# Patient Record
Sex: FEMALE | Race: WHITE | NOT HISPANIC OR LATINO | Employment: STUDENT | URBAN - METROPOLITAN AREA
[De-identification: names, ages, dates, MRNs, and addresses within clinical notes are randomized per-mention and may not be internally consistent; named-entity substitution may affect disease eponyms.]

---
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
## 2024-03-09 ENCOUNTER — OFFICE VISIT (OUTPATIENT)
Dept: URGENT CARE | Age: 19
End: 2024-03-09
Payer: COMMERCIAL

## 2024-03-09 VITALS
RESPIRATION RATE: 18 BRPM | TEMPERATURE: 99.1 F | SYSTOLIC BLOOD PRESSURE: 150 MMHG | HEART RATE: 99 BPM | DIASTOLIC BLOOD PRESSURE: 89 MMHG | OXYGEN SATURATION: 98 %

## 2024-03-09 DIAGNOSIS — R00.2 PALPITATIONS: ICD-10-CM

## 2024-03-09 DIAGNOSIS — R42 DIZZINESS: Primary | ICD-10-CM

## 2024-03-09 LAB
GLUCOSE SERPL-MCNC: 100 MG/DL (ref 65–140)
SARS-COV-2 AG UPPER RESP QL IA: NEGATIVE
SL AMB  POCT GLUCOSE, UA: NORMAL
SL AMB LEUKOCYTE ESTERASE,UA: NORMAL
SL AMB POCT BILIRUBIN,UA: NORMAL
SL AMB POCT BLOOD,UA: NORMAL
SL AMB POCT CLARITY,UA: CLEAR
SL AMB POCT COLOR,UA: YELLOW
SL AMB POCT GLUCOSE BLD: 100
SL AMB POCT KETONES,UA: NORMAL
SL AMB POCT NITRITE,UA: NORMAL
SL AMB POCT PH,UA: 7
SL AMB POCT SPECIFIC GRAVITY,UA: 1
SL AMB POCT URINE HCG: NEGATIVE
SL AMB POCT URINE PROTEIN: NORMAL
SL AMB POCT UROBILINOGEN: 0.2
VALID CONTROL: NORMAL

## 2024-03-09 PROCEDURE — 82948 REAGENT STRIP/BLOOD GLUCOSE: CPT | Performed by: NURSE PRACTITIONER

## 2024-03-09 PROCEDURE — 81025 URINE PREGNANCY TEST: CPT | Performed by: NURSE PRACTITIONER

## 2024-03-09 PROCEDURE — 99215 OFFICE O/P EST HI 40 MIN: CPT | Performed by: NURSE PRACTITIONER

## 2024-03-09 PROCEDURE — 81002 URINALYSIS NONAUTO W/O SCOPE: CPT | Performed by: NURSE PRACTITIONER

## 2024-03-09 PROCEDURE — 87811 SARS-COV-2 COVID19 W/OPTIC: CPT | Performed by: NURSE PRACTITIONER

## 2024-03-09 PROCEDURE — 93005 ELECTROCARDIOGRAM TRACING: CPT | Performed by: NURSE PRACTITIONER

## 2024-03-09 NOTE — PATIENT INSTRUCTIONS
All testing today was negative for an acute process; EKG, urine, urine pregnancy test, blood glucose and covid.  You are to stop the caffeine use as this is a stimulant.    You are to follow up with your PCP for further testing if symptoms continue. Stay hydrated and get enough sleep.  Go to the ED if symptoms worsen

## 2024-03-09 NOTE — PROGRESS NOTES
"  [facility]        NAME: Mago rGay is a 18 y.o. female  : 2005    MRN: [id number]  DATE: 2024  TIME: 1:54 PM    Assessment and Plan   Dizziness [R42]  1. Dizziness  ECG 12 lead    POCT urine dip    POCT urine HCG    Poct Covid 19 Rapid Antigen Test    POCT blood glucose      2. Palpitations  ECG 12 lead    POCT urine dip    POCT urine HCG    Poct Covid 19 Rapid Antigen Test    POCT blood glucose            Patient Instructions       Follow up with PCP in 3-5 days.  Proceed to  ER if symptoms worsen.    If tests have been performed at [facility], our office will contact you with results if changes need to be made to the care plan discussed with you at the visit.  You can review your full results on [facility].        All testing today was negative for an acute process; EKG, urine, urine pregnancy test, blood glucose and covid.  You are to stop the caffeine use as this is a stimulant.    You are to follow up with your PCP for further testing if symptoms continue. Stay hydrated and get enough sleep.  Go to the ED if symptoms worsen      Chief Complaint     Chief Complaint   Patient presents with    Dizziness     Dizziness off and on several days. Tiredness         History of Present Illness       This is a 18 year old female who states that since this Tuesday she has had dizziness and feeling fuzzy. She denies the room spinning.  She states that she has had these symptoms intermittent since. She denies n/v/d, fevers chills. She states a friend was sick with something.  She is a college student at [employer] and PCP is in [place].   She denies hx of vertigo.  She denies any cold symptoms.  She states she did have \"3 shots of espresso and that is when she noted her symptoms\".  She states she has stopped all caffeine and is still having the symptoms.  She denies dysuria or pregnancy but admits no birth control and is using condoms.  She denies hx of cardiac issues, or diabetes. But " states is concerned she could have diabetes.  She states she is vegan.            Review of Systems   Review of Systems   Constitutional: Negative.    HENT: Negative.     Eyes: Negative.    Cardiovascular:  Positive for palpitations.   Gastrointestinal: Negative.    Endocrine: Negative.    Genitourinary: Negative.    Musculoskeletal: Negative.    Skin: Negative.    Allergic/Immunologic: Negative.    Neurological:  Positive for dizziness.   Hematological: Negative.    Psychiatric/Behavioral: Negative.           Current Medications       Current Outpatient Medications:     acetaminophen (TYLENOL) 325 mg tablet, Take 3 tablets (975 mg total) by mouth every 8 (eight) hours, Disp: 60 tablet, Rfl: 0    methocarbamol (ROBAXIN) 750 mg tablet, Take 1 tablet (750 mg total) by mouth every 6 (six) hours for 27 doses, Disp: 20 tablet, Rfl: 0    Current Allergies     Allergies as of 03/09/2024    (No Known Allergies)            The following portions of the patient's history were reviewed and updated as appropriate: allergies, current medications, past family history, past medical history, past social history, past surgical history and problem list.     History reviewed. No pertinent past medical history.    History reviewed. No pertinent surgical history.    History reviewed. No pertinent family history.      Medications have been verified.        Objective   /89   Pulse 99   Temp 99.1 °F (37.3 °C) (Oral)   Resp 18   LMP 02/05/2024 (Approximate)   SpO2 98%   Patient's last menstrual period was 02/05/2024 (approximate).       Physical Exam     Physical Exam  Vitals and nursing note reviewed.   Constitutional:       General: She is not in acute distress.     Appearance: Normal appearance. She is normal weight. She is not ill-appearing, toxic-appearing or diaphoretic.   HENT:      Head: Normocephalic and atraumatic.      Right Ear: Tympanic membrane and ear canal normal.      Left Ear: Tympanic membrane and ear canal  normal.      Nose: Nose normal. No congestion or rhinorrhea.      Mouth/Throat:      Mouth: Mucous membranes are moist.      Pharynx: Oropharynx is clear. No oropharyngeal exudate or posterior oropharyngeal erythema.   Eyes:      Extraocular Movements: Extraocular movements intact.      Pupils: Pupils are equal, round, and reactive to light.   Cardiovascular:      Rate and Rhythm: Normal rate and regular rhythm.      Pulses: Normal pulses.      Heart sounds: Normal heart sounds. No murmur heard.  Pulmonary:      Effort: Pulmonary effort is normal. No respiratory distress.      Breath sounds: Normal breath sounds. No stridor. No wheezing, rhonchi or rales.   Chest:      Chest wall: No tenderness.   Musculoskeletal:         General: Normal range of motion.      Cervical back: Normal range of motion and neck supple.   Skin:     General: Skin is warm and dry.      Capillary Refill: Capillary refill takes less than 2 seconds.   Neurological:      General: No focal deficit present.      Mental Status: She is alert and oriented to person, place, and time.      GCS: GCS eye subscore is 4. GCS verbal subscore is 5. GCS motor subscore is 6.      Cranial Nerves: Cranial nerves 2-12 are intact. No cranial nerve deficit.      Sensory: Sensation is intact. No sensory deficit.      Motor: Motor function is intact. No weakness.      Coordination: Coordination is intact. Coordination normal.      Gait: Gait and tandem walk normal.      Deep Tendon Reflexes: Reflexes normal.   Psychiatric:         Mood and Affect: Mood normal.         Behavior: Behavior normal.         Thought Content: Thought content normal.         Judgment: Judgment normal.             Poct urine negative.   Hcg negative  Blood glucose    EKG NSR 99    QRS 86    No stemi.

## 2024-03-10 LAB
ATRIAL RATE: 99 BPM
P AXIS: 49 DEGREES
PR INTERVAL: 140 MS
QRS AXIS: 96 DEGREES
QRSD INTERVAL: 86 MS
QT INTERVAL: 346 MS
QTC INTERVAL: 444 MS
T WAVE AXIS: 45 DEGREES
VENTRICULAR RATE: 99 BPM

## 2024-03-10 PROCEDURE — 93010 ELECTROCARDIOGRAM REPORT: CPT | Performed by: INTERNAL MEDICINE

## 2024-07-22 ENCOUNTER — OFFICE VISIT (OUTPATIENT)
Dept: URGENT CARE | Age: 19
End: 2024-07-22
Payer: COMMERCIAL

## 2024-07-22 ENCOUNTER — APPOINTMENT (OUTPATIENT)
Dept: RADIOLOGY | Age: 19
End: 2024-07-22
Payer: COMMERCIAL

## 2024-07-22 VITALS
HEART RATE: 124 BPM | RESPIRATION RATE: 18 BRPM | TEMPERATURE: 98.1 F | SYSTOLIC BLOOD PRESSURE: 126 MMHG | DIASTOLIC BLOOD PRESSURE: 84 MMHG | OXYGEN SATURATION: 100 %

## 2024-07-22 DIAGNOSIS — J06.9 VIRAL URI: Primary | ICD-10-CM

## 2024-07-22 DIAGNOSIS — R05.1 ACUTE COUGH: ICD-10-CM

## 2024-07-22 PROCEDURE — 71046 X-RAY EXAM CHEST 2 VIEWS: CPT

## 2024-07-22 PROCEDURE — G0382 LEV 3 HOSP TYPE B ED VISIT: HCPCS | Performed by: STUDENT IN AN ORGANIZED HEALTH CARE EDUCATION/TRAINING PROGRAM

## 2024-07-22 PROCEDURE — S9083 URGENT CARE CENTER GLOBAL: HCPCS | Performed by: STUDENT IN AN ORGANIZED HEALTH CARE EDUCATION/TRAINING PROGRAM

## 2024-07-22 RX ORDER — BROMPHENIRAMINE MALEATE, PSEUDOEPHEDRINE HYDROCHLORIDE, AND DEXTROMETHORPHAN HYDROBROMIDE 2; 30; 10 MG/5ML; MG/5ML; MG/5ML
5 SYRUP ORAL 3 TIMES DAILY PRN
Qty: 120 ML | Refills: 0 | Status: SHIPPED | OUTPATIENT
Start: 2024-07-22

## 2024-07-22 RX ORDER — FLUTICASONE PROPIONATE 50 MCG
1 SPRAY, SUSPENSION (ML) NASAL DAILY
Qty: 11.1 ML | Refills: 0 | Status: SHIPPED | OUTPATIENT
Start: 2024-07-22

## 2024-07-22 NOTE — PATIENT INSTRUCTIONS
I do not see pneumonia on your chest x-ray.  The radiologist will also read your checks x-ray, if there are any changes to our plan, we will give you a call.  You can also follow your results on your MyChart.  I am sending a medication to take as needed for cough.   To help clear out the mucus and reduce post-nasal drip which can cause sore throat and cough - use saline nasal spray or saline nasal rinse (with distilled or boiled water).  After nasal saline, use flonase nasal spray which is a steroid to reduce post nasal drip leading to cough.  To soothe sore throat, you may try warm tea with honey, warm salt water gargles.  You may take Tylenol or Motrin to help with fever/chills or muscle aches.  Stay well hydrated and get plenty of rest.     If your symptoms are worsening or fail to improve in the coming days, please return to see us or schedule with your PCP.  If you develop any severe/worsening symptoms please go to the ER.      Nasal Saline Rinse:

## 2024-07-22 NOTE — PROGRESS NOTES
[facility]        NAME: Mago Gray is a 19 y.o. female  : 2005    MRN: [id number]  DATE: 2024  TIME: 7:27 PM    Assessment and Plan   Viral URI [J06.9]  1. Viral URI        2. Acute cough  XR chest pa & lateral            Patient Instructions   I do not see pneumonia on your chest x-ray.  The radiologist will also read your checks x-ray, if there are any changes to our plan, we will give you a call.  You can also follow your results on your MyChart.  I am sending a medication to take as needed for cough.   To help clear out the mucus and reduce post-nasal drip which can cause sore throat and cough - use saline nasal spray or saline nasal rinse (with distilled or boiled water).  After nasal saline, use flonase nasal spray which is a steroid to reduce post nasal drip leading to cough.  To soothe sore throat, you may try warm tea with honey, warm salt water gargles.  You may take Tylenol or Motrin to help with fever/chills or muscle aches.  Stay well hydrated and get plenty of rest.     If your symptoms are worsening or fail to improve in the coming days, please return to see us or schedule with your PCP.  If you develop any severe/worsening symptoms please go to the ER.    Follow up with PCP in 3-5 days.  Proceed to  ER if symptoms worsen.    If tests have been performed at [facility], our office will contact you with results if changes need to be made to the care plan discussed with you at the visit.  You can review your full results on [facility] MyChart.    Chief Complaint     Chief Complaint   Patient presents with    Cough    Fever    Fatigue    Headache     Patient states productive cough and due cough chest hurt, feels fatigue and fever and headache  since Friday.         History of Present Illness       Patient presents for evaluation of cough.  Over the last week, started with mild scratchy throat, over the last 4 days productive cough.  Couple days ago, she had temp mildly  elevated to 99.5F, does not feel much nasal congestion.  Took Advil X1 for some muscle aches.  No wheezing, shortness of breath.  She was concern for possible pneumonia as the cough felt deep and she is going away for trip in a few days.  Notes that she always is tachycardic in doctors offices as she is nervous.        Review of Systems   Review of Systems   All other systems reviewed and are negative.        Current Medications       Current Outpatient Medications:     acetaminophen (TYLENOL) 325 mg tablet, Take 3 tablets (975 mg total) by mouth every 8 (eight) hours, Disp: 60 tablet, Rfl: 0    methocarbamol (ROBAXIN) 750 mg tablet, Take 1 tablet (750 mg total) by mouth every 6 (six) hours for 27 doses, Disp: 20 tablet, Rfl: 0    Current Allergies     Allergies as of 07/22/2024    (No Known Allergies)            The following portions of the patient's history were reviewed and updated as appropriate: allergies, current medications, past family history, past medical history, past social history, past surgical history and problem list.     History reviewed. No pertinent past medical history.    History reviewed. No pertinent surgical history.    No family history on file.      Medications have been verified.        Objective   /84   Pulse (!) 124   Temp 98.1 °F (36.7 °C) (Tympanic)   Resp 18   LMP 07/08/2024 (Approximate)   SpO2 100%   Patient's last menstrual period was 07/08/2024 (approximate).       Physical Exam     Physical Exam  Vitals and nursing note reviewed.   Constitutional:       General: She is not in acute distress.     Appearance: Normal appearance. She is not ill-appearing or toxic-appearing.   HENT:      Head: Normocephalic and atraumatic.      Right Ear: Tympanic membrane, ear canal and external ear normal.      Left Ear: Tympanic membrane, ear canal and external ear normal.      Nose: Nose normal. No congestion or rhinorrhea.      Mouth/Throat:      Mouth: Mucous membranes are moist.       Comments: +PND/ cobblestoning  Eyes:      Extraocular Movements: Extraocular movements intact.   Cardiovascular:      Rate and Rhythm: Normal rate and regular rhythm.      Heart sounds: Normal heart sounds.   Pulmonary:      Effort: Pulmonary effort is normal. No respiratory distress.      Breath sounds: Normal breath sounds. No stridor. No wheezing, rhonchi or rales.   Skin:     General: Skin is warm and dry.      Capillary Refill: Capillary refill takes less than 2 seconds.      Findings: No rash.   Neurological:      Mental Status: She is alert.      Gait: Gait normal.   Psychiatric:         Behavior: Behavior normal.

## 2025-04-28 ENCOUNTER — OFFICE VISIT (OUTPATIENT)
Dept: URGENT CARE | Age: 20
End: 2025-04-28
Payer: COMMERCIAL

## 2025-04-28 VITALS
WEIGHT: 165 LBS | HEART RATE: 104 BPM | OXYGEN SATURATION: 98 % | HEIGHT: 66 IN | TEMPERATURE: 97.8 F | SYSTOLIC BLOOD PRESSURE: 128 MMHG | RESPIRATION RATE: 18 BRPM | BODY MASS INDEX: 26.52 KG/M2 | DIASTOLIC BLOOD PRESSURE: 70 MMHG

## 2025-04-28 DIAGNOSIS — J02.9 SORE THROAT: Primary | ICD-10-CM

## 2025-04-28 LAB — S PYO AG THROAT QL: NEGATIVE

## 2025-04-28 PROCEDURE — S9083 URGENT CARE CENTER GLOBAL: HCPCS | Performed by: STUDENT IN AN ORGANIZED HEALTH CARE EDUCATION/TRAINING PROGRAM

## 2025-04-28 PROCEDURE — G0382 LEV 3 HOSP TYPE B ED VISIT: HCPCS | Performed by: STUDENT IN AN ORGANIZED HEALTH CARE EDUCATION/TRAINING PROGRAM

## 2025-04-28 PROCEDURE — 87880 STREP A ASSAY W/OPTIC: CPT | Performed by: STUDENT IN AN ORGANIZED HEALTH CARE EDUCATION/TRAINING PROGRAM

## 2025-04-28 PROCEDURE — 87070 CULTURE OTHR SPECIMN AEROBIC: CPT | Performed by: STUDENT IN AN ORGANIZED HEALTH CARE EDUCATION/TRAINING PROGRAM

## 2025-04-28 NOTE — LETTER
April 28, 2025     Patient: Mago Gray   YOB: 2005   Date of Visit: 4/28/2025       To Whom it May Concern:    Mago Gray was seen in my clinic on 4/28/2025.  Please excuse her on 4/29/2025.  If you have any questions or concerns, please don't hesitate to call.         Sincerely,          Cristal Spear, DO

## 2025-04-28 NOTE — PATIENT INSTRUCTIONS
Your rapid strep test was negative, we will send your throat swab to the lab for throat culture, if this grows group A strep, we will call you to start antibiotics.  Otherwise, it is likely that a virus is causing your symptoms.  I recommend continuing alternating Advil and Tylenol as needed for pain.  Use warm salt water gargles, warm tea with honey to help with your throat.  Taking a daily allergy medicine such as Zyrtec can help with sinus congestion and ear discomfort.    If your symptoms are not improving in the coming week, please follow-up with your PCP.  If you have any severe worsening symptoms, please go to the ER.

## 2025-04-28 NOTE — PROGRESS NOTES
[facility]        NAME: Mago Gray is a 20 y.o. female  : 2005    MRN: [id number]  DATE: 2025  TIME: 12:41 PM    Assessment and Plan   Sore throat [J02.9]  1. Sore throat  POCT rapid ANTIGEN strepA    Throat culture            Patient Instructions   Your rapid strep test was negative, we will send your throat swab to the lab for throat culture, if this grows group A strep, we will call you to start antibiotics.  Otherwise, it is likely that a virus is causing your symptoms.  I recommend continuing alternating Advil and Tylenol as needed for pain.  Use warm salt water gargles, warm tea with honey to help with your throat.  Taking a daily allergy medicine such as Zyrtec can help with sinus congestion and ear discomfort.    If your symptoms are not improving in the coming week, please follow-up with your PCP.  If you have any severe worsening symptoms, please go to the ER.    Follow up with PCP in 3-5 days.  Proceed to  ER if symptoms worsen.    If tests have been performed at [facility], our office will contact you with results if changes need to be made to the care plan discussed with you at the visit.  You can [facility] full [facility] on [facility].    Chief Complaint     Chief Complaint   Patient presents with    Sore Throat     Pt c/o sore throat that has worsening over the last three days. Also c/o ear pressure.          History of Present Illness       Patient presents for evaluation of sore throat that started on .  Started with a tickle in her throat, has been worsening a bit each day since then.  No fevers, chills.  She did feel some bilateral ear discomfort.  She took Advil this morning which helped with her symptoms.  No congestion, cough.      Sore Throat         Review of Systems   Review of Systems   HENT:  Positive for sore throat.    All other systems reviewed and are negative.        Current Medications       Current Outpatient Medications:      "acetaminophen (TYLENOL) 325 mg tablet, Take 3 tablets (975 mg total) by mouth every 8 (eight) hours, Disp: 60 tablet, Rfl: 0    brompheniramine-pseudoephedrine-DM 30-2-10 MG/5ML syrup, Take 5 mL by mouth 3 (three) times a day as needed for cough (Patient not taking: Reported on 4/28/2025), Disp: 120 mL, Rfl: 0    fluticasone (FLONASE) 50 mcg/act nasal spray, 1 spray into each nostril daily (Patient not taking: Reported on 4/28/2025), Disp: 11.1 mL, Rfl: 0    methocarbamol (ROBAXIN) 750 mg tablet, Take 1 tablet (750 mg total) by mouth every 6 (six) hours for 27 doses, Disp: 20 tablet, Rfl: 0    Current Allergies     Allergies as of 04/28/2025    (No Known Allergies)            The following portions of the patient's history were reviewed and updated as appropriate: allergies, current medications, past family history, past medical history, past social history, past surgical history and problem list.     No past medical history on file.    No past surgical history on file.    No family history on file.      Medications have been verified.        Objective   /70   Pulse 104   Temp 97.8 °F (36.6 °C)   Resp 18   Ht 5' 6\" (1.676 m)   Wt 74.8 kg (165 lb)   LMP 03/27/2025 (Approximate)   SpO2 98%   BMI 26.63 kg/m²   Patient's last menstrual period was 03/27/2025 (approximate).       Physical Exam     Physical Exam  Vitals and nursing note reviewed.   Constitutional:       General: She is not in acute distress.     Appearance: Normal appearance. She is not ill-appearing or toxic-appearing.   HENT:      Head: Normocephalic and atraumatic.      Right Ear: Ear canal and external ear normal.      Left Ear: Ear canal and external ear normal.      Ears:      Comments: Bilateral clear effusions     Nose: Nose normal.      Mouth/Throat:      Mouth: Mucous membranes are moist.      Pharynx: Uvula midline.      Tonsils: 3+ on the right. 2+ on the left.      Comments: Right tonsil with exudate  Eyes:      Extraocular " Movements: Extraocular movements intact.   Cardiovascular:      Rate and Rhythm: Normal rate and regular rhythm.      Heart sounds: Normal heart sounds.   Pulmonary:      Effort: Pulmonary effort is normal. No respiratory distress.      Breath sounds: Normal breath sounds. No stridor. No wheezing, rhonchi or rales.   Skin:     General: Skin is warm and dry.      Capillary Refill: Capillary refill takes less than 2 seconds.      Findings: No rash.   Neurological:      Mental Status: She is alert.      Gait: Gait normal.   Psychiatric:         Behavior: Behavior normal.

## 2025-04-29 LAB — BACTERIA THROAT CULT: NORMAL

## 2025-05-02 ENCOUNTER — APPOINTMENT (EMERGENCY)
Dept: RADIOLOGY | Facility: HOSPITAL | Age: 20
End: 2025-05-02
Payer: COMMERCIAL

## 2025-05-02 ENCOUNTER — OFFICE VISIT (OUTPATIENT)
Dept: URGENT CARE | Age: 20
End: 2025-05-02
Payer: COMMERCIAL

## 2025-05-02 ENCOUNTER — HOSPITAL ENCOUNTER (EMERGENCY)
Facility: HOSPITAL | Age: 20
Discharge: HOME | End: 2025-05-02
Attending: EMERGENCY MEDICINE
Payer: COMMERCIAL

## 2025-05-02 VITALS
HEIGHT: 66 IN | WEIGHT: 188.1 LBS | HEART RATE: 106 BPM | RESPIRATION RATE: 20 BRPM | BODY MASS INDEX: 30.23 KG/M2 | SYSTOLIC BLOOD PRESSURE: 121 MMHG | OXYGEN SATURATION: 99 % | DIASTOLIC BLOOD PRESSURE: 71 MMHG | TEMPERATURE: 98.4 F

## 2025-05-02 VITALS
RESPIRATION RATE: 18 BRPM | TEMPERATURE: 98.2 F | OXYGEN SATURATION: 99 % | DIASTOLIC BLOOD PRESSURE: 79 MMHG | HEART RATE: 86 BPM | SYSTOLIC BLOOD PRESSURE: 146 MMHG

## 2025-05-02 DIAGNOSIS — J02.9 ACUTE PHARYNGITIS, UNSPECIFIED ETIOLOGY: Primary | ICD-10-CM

## 2025-05-02 DIAGNOSIS — J03.90 TONSILLITIS: Primary | ICD-10-CM

## 2025-05-02 LAB
ANION GAP SERPL CALCULATED.3IONS-SCNC: 9 MMOL/L (ref 4–13)
ANISOCYTOSIS BLD QL SMEAR: PRESENT
BASOPHILS # BLD MANUAL: 0 THOUSAND/UL (ref 0–0.1)
BASOPHILS NFR MAR MANUAL: 0 % (ref 0–1)
BUN SERPL-MCNC: 7 MG/DL (ref 5–25)
CALCIUM SERPL-MCNC: 9.5 MG/DL (ref 8.4–10.2)
CHLORIDE SERPL-SCNC: 104 MMOL/L (ref 96–108)
CO2 SERPL-SCNC: 25 MMOL/L (ref 21–32)
CREAT SERPL-MCNC: 0.46 MG/DL (ref 0.6–1.3)
EOSINOPHIL # BLD MANUAL: 0 THOUSAND/UL (ref 0–0.4)
EOSINOPHIL NFR BLD MANUAL: 0 % (ref 0–6)
ERYTHROCYTE [DISTWIDTH] IN BLOOD BY AUTOMATED COUNT: 14 % (ref 11.6–15.1)
EXT PREGNANCY TEST URINE: NEGATIVE
EXT. CONTROL: NORMAL
GFR SERPL CREATININE-BSD FRML MDRD: 142 ML/MIN/1.73SQ M
GLUCOSE SERPL-MCNC: 82 MG/DL (ref 65–140)
HCT VFR BLD AUTO: 38.6 % (ref 34.8–46.1)
HGB BLD-MCNC: 12.8 G/DL (ref 11.5–15.4)
LYMPHOCYTES # BLD AUTO: 41 % (ref 14–44)
LYMPHOCYTES # BLD AUTO: 5.53 THOUSAND/UL (ref 0.6–4.47)
MCH RBC QN AUTO: 28.1 PG (ref 26.8–34.3)
MCHC RBC AUTO-ENTMCNC: 33.2 G/DL (ref 31.4–37.4)
MCV RBC AUTO: 85 FL (ref 82–98)
MONOCYTES # BLD AUTO: 0.94 THOUSAND/UL (ref 0–1.22)
MONOCYTES NFR BLD: 8 % (ref 4–12)
NEUTROPHILS # BLD MANUAL: 5.3 THOUSAND/UL (ref 1.85–7.62)
NEUTS BAND NFR BLD MANUAL: 4 % (ref 0–8)
NEUTS SEG NFR BLD AUTO: 41 % (ref 43–75)
PLATELET # BLD AUTO: 269 THOUSANDS/UL (ref 149–390)
PLATELET BLD QL SMEAR: ADEQUATE
PMV BLD AUTO: 8.9 FL (ref 8.9–12.7)
POLYCHROMASIA BLD QL SMEAR: PRESENT
POTASSIUM SERPL-SCNC: 4.3 MMOL/L (ref 3.5–5.3)
RBC # BLD AUTO: 4.55 MILLION/UL (ref 3.81–5.12)
RBC MORPH BLD: PRESENT
SODIUM SERPL-SCNC: 138 MMOL/L (ref 135–147)
VARIANT LYMPHS # BLD AUTO: 6 %
WBC # BLD AUTO: 11.77 THOUSAND/UL (ref 4.31–10.16)

## 2025-05-02 PROCEDURE — 85027 COMPLETE CBC AUTOMATED: CPT

## 2025-05-02 PROCEDURE — 96365 THER/PROPH/DIAG IV INF INIT: CPT

## 2025-05-02 PROCEDURE — 85007 BL SMEAR W/DIFF WBC COUNT: CPT

## 2025-05-02 PROCEDURE — 80048 BASIC METABOLIC PNL TOTAL CA: CPT

## 2025-05-02 PROCEDURE — 86308 HETEROPHILE ANTIBODY SCREEN: CPT

## 2025-05-02 PROCEDURE — G0382 LEV 3 HOSP TYPE B ED VISIT: HCPCS

## 2025-05-02 PROCEDURE — 96375 TX/PRO/DX INJ NEW DRUG ADDON: CPT

## 2025-05-02 PROCEDURE — 70491 CT SOFT TISSUE NECK W/DYE: CPT

## 2025-05-02 PROCEDURE — 99284 EMERGENCY DEPT VISIT MOD MDM: CPT | Performed by: EMERGENCY MEDICINE

## 2025-05-02 PROCEDURE — 81025 URINE PREGNANCY TEST: CPT

## 2025-05-02 PROCEDURE — S9083 URGENT CARE CENTER GLOBAL: HCPCS

## 2025-05-02 PROCEDURE — 99283 EMERGENCY DEPT VISIT LOW MDM: CPT

## 2025-05-02 PROCEDURE — 36415 COLL VENOUS BLD VENIPUNCTURE: CPT

## 2025-05-02 RX ORDER — METHYLPREDNISOLONE SODIUM SUCCINATE 125 MG/2ML
60 INJECTION, POWDER, LYOPHILIZED, FOR SOLUTION INTRAMUSCULAR; INTRAVENOUS ONCE
Status: COMPLETED | OUTPATIENT
Start: 2025-05-02 | End: 2025-05-02

## 2025-05-02 RX ORDER — METHYLPREDNISOLONE 4 MG/1
TABLET ORAL
Qty: 21 TABLET | Refills: 0 | Status: SHIPPED | OUTPATIENT
Start: 2025-05-02

## 2025-05-02 RX ADMIN — METHYLPREDNISOLONE SODIUM SUCCINATE 60 MG: 125 INJECTION, POWDER, FOR SOLUTION INTRAMUSCULAR; INTRAVENOUS at 18:35

## 2025-05-02 RX ADMIN — SODIUM CHLORIDE 3 G: 9 INJECTION, SOLUTION INTRAVENOUS at 18:41

## 2025-05-02 RX ADMIN — IOHEXOL 85 ML: 350 INJECTION, SOLUTION INTRAVENOUS at 17:40

## 2025-05-02 NOTE — ED ATTENDING ATTESTATION
Final Diagnosis:  1. Tonsillitis           I, Jose F Jacobsen MD, saw and evaluated the patient. All available labs and X-rays were ordered by me or the resident and have been reviewed by myself. I discussed the patient with the resident / non-physician and agree with the resident's / non-physician practitioner's findings and plan as documented in the resident's / non-physician practicitioner's note, except where noted.   At this point, I agree with the current assessment done in the ED.   I was present during key portions of all procedures performed unless otherwise stated.     Chief Complaint   Patient presents with    Medical Problem     Pt was seen at [facility] for swollen tonsils. Pt complains of sore throat, pain when swallowing and ear pain. Pt was sent here for re eval due to the severity of swelling she was told. Pt breathing normal during triage.      This is a 20 y.o. female presenting for evaluation of throat swelling.  Patient was seen a couple days ago for a sore throat and evaluated for strep.  Rapid as well as culture was negative.  She discharged at that time.  Since then she has been having persistent throat pain.  Went to another facility for evaluation was referred to the emergency department for further testing.  Patient states that she has slightly more pain on the right side than the left.  She does not know of any specific sick contents but she does go to college where other people have been sick recently.  Denies any history of ENT issues.    PMH:   has no past medical history on file.    PSH:   has no past surgical history on file.    Procedures     Social:  Social History     Substance and Sexual Activity   Alcohol Use Yes    Comment: socially     Social History     Tobacco Use   Smoking Status Never    Passive exposure: Never   Smokeless Tobacco Never     Social History     Substance and Sexual Activity   Drug Use Never     PE:  Vitals:    05/02/25 1448 05/02/25 1449   BP: 146/79    BP  Location: Left arm    Pulse: 86    Resp: 18    Temp: 98.2 °F (36.8 °C)    TempSrc: Oral    SpO2: 99% 99%       A:    Unless otherwise specified above:     General: VS reviewed  Appears in NAD     Head: Normocephalic, atraumatic     CV: No pallor noted  Lungs:   No respiratory distress     Abdomen:  Soft, non-tender, non-distended     MSK:   No obvious deformity     Skin: No obvious rash.     Neuro: Awake, alert, GCS15, CN II-XII grossly intact. Speaking in full sentences.   Motor grossly intact.     Psychiatric/Behavioral: Appropriate mood and affect   Exam: deferred    P:  - Oropharynx is moist.  Uvula is midline.  Significant tonsillar exudate.  Cervical lymphadenopathy.  No pain with ranging of the neck.  - Patient presents for evaluation of throat pain and evaluation for possible abscess formation.  Based on exam and history I think a retropharyngeal abscess is unlikely.  Will evaluate for other pathology, anemia, electrolyte disturbances.  - Patient found to have phlegmonous changes without signs of abscess.  Reviewed with ENT.  No intervention on their end.  Will cover with antibiotics.  Return precautions reviewed  - 13 point ROS was performed and all are normal unless stated in the history above.   - Nursing note reviewed. Vitals reviewed.   - Orders placed by myself and/or advanced practitioner / resident.    - Previous chart was reviewed  - No language barrier.   - History obtained from patient.   - There are no limitations to the history obtained.     Unless otherwise specified:  CC is exclusive from any separately billable procedures  CC is exclusive of treating other patients  CC is exclusive of teaching time     Code Status: Prior  Advance Directive and Living Will:      Power of :    POLST:      Medications   ampicillin-sulbactam (UNASYN) 3 g in sodium chloride 0.9 % 100 mL IVPB (3 g Intravenous New Bag 5/2/25 2416)   iohexol (OMNIPAQUE) 350 MG/ML injection (MULTI-DOSE) 85 mL (85 mL  Intravenous Given 5/2/25 1740)   methylPREDNISolone sodium succinate (Solu-MEDROL) injection 60 mg (60 mg Intravenous Given 5/2/25 1835)     CT soft tissue neck with contrast   Final Result      Bilateral palatine tonsillitis with developing phlegmonous changes/early abscess formation, noting mild-moderate narrowing of the airway at this level. Reactive cervical lymphadenopathy.      The study was marked in EPIC for immediate notification.            Workstation performed: [id number]           Orders Placed This Encounter   Procedures    CT soft tissue neck with contrast    CBC and differential    Basic metabolic panel    Mononucleosis screen    RBC Morphology Reflex Test    Ambulatory Referral to [facility]    Inpatient consult to ENT    POCT pregnancy, urine     Labs Reviewed   CBC AND DIFFERENTIAL - Abnormal       Result Value Ref Range Status    WBC 11.77 (*) 4.31 - 10.16 Thousand/uL Final    RBC 4.55  3.81 - 5.12 Million/uL Final    Hemoglobin 12.8  11.5 - 15.4 g/dL Final    Hematocrit 38.6  34.8 - 46.1 % Final    MCV 85  82 - 98 fL Final    MCH 28.1  26.8 - 34.3 pg Final    MCHC 33.2  31.4 - 37.4 g/dL Final    RDW 14.0  11.6 - 15.1 % Final    MPV 8.9  8.9 - 12.7 fL Final    Platelets 269  149 - 390 Thousands/uL Final    Narrative:     This is an appended report.  These results have been appended to a previously verified report.   BASIC METABOLIC PANEL - Abnormal    Sodium 138  135 - 147 mmol/L Final    Potassium 4.3  3.5 - 5.3 mmol/L Final    Chloride 104  96 - 108 mmol/L Final    CO2 25  21 - 32 mmol/L Final    ANION GAP 9  4 - 13 mmol/L Final    BUN 7  5 - 25 mg/dL Final    Creatinine 0.46 (*) 0.60 - 1.30 mg/dL Final    Comment: Standardized to IDMS reference method    Glucose 82  65 - 140 mg/dL Final    Comment: If the patient is fasting, the ADA then defines impaired fasting glucose as > 100 mg/dL and diabetes as > or equal to 123 mg/dL.    Calcium 9.5  8.4 - 10.2 mg/dL Final    eGFR 142  ml/min/1.73sq  m Final    Narrative:     National Kidney Disease Foundation guidelines for Chronic Kidney Disease (CKD):     Stage 1 with normal or high GFR (GFR > 90 mL/min/1.73 square meters)    Stage 2 Mild CKD (GFR = 60-89 mL/min/1.73 square meters)    Stage 3A Moderate CKD (GFR = 45-59 mL/min/1.73 square meters)    Stage 3B Moderate CKD (GFR = 30-44 mL/min/1.73 square meters)    Stage 4 Severe CKD (GFR = 15-29 mL/min/1.73 square meters)    Stage 5 End Stage CKD (GFR <15 mL/min/1.73 square meters)  Note: GFR calculation is accurate only with a steady state creatinine   MANUAL DIFFERENTIAL(PHLEBS DO NOT ORDER) - Abnormal    Segmented % 41 (*) 43 - 75 % Final    Bands % 4  0 - 8 % Final    Lymphocytes % 41  14 - 44 % Final    Monocytes % 8  4 - 12 % Final    Eosinophils % 0  0 - 6 % Final    Basophils % 0  0 - 1 % Final    Atypical Lymphocytes % 6 (*) <=0 % Final    Absolute Neutrophils 5.30  1.85 - 7.62 Thousand/uL Final    Absolute Lymphocytes 5.53 (*) 0.60 - 4.47 Thousand/uL Final    Absolute Monocytes 0.94  0.00 - 1.22 Thousand/uL Final    Absolute Eosinophils 0.00  0.00 - 0.40 Thousand/uL Final    Absolute Basophils 0.00  0.00 - 0.10 Thousand/uL Final    Total Counted     Final    RBC Morphology Present   Final    Platelet Estimate Adequate  Adequate Final    Anisocytosis Present   Final    Polychromasia Present   Final   POCT PREGNANCY, URINE - Normal    EXT Preg Test, Ur Negative   Final    Control Valid   Final   RBC MORPHOLOGY REFLEX TEST   MONONUCLEOSIS SCREEN     Time reflects when diagnosis was documented in both MDM as applicable and the Disposition within this note       Time User Action Codes Description Comment    5/2/2025  6:22 PM Jame Alejandre Add [J03.90] Tonsillitis           ED Disposition       ED Disposition   Discharge    Condition   Stable    Date/Time   Fri May 2, 2025  7:03 PM    Comment   Mago Gray discharge to home/self care.                   Follow-up Information       Follow up With  "Specialties Details Why Contact Info Additional Information    [facility] Schedule an appointment as soon as possible for a visit in 2 days  [address] [address] [phone] [facility], [address] [place], [place], [address] [phone]          Patient's Medications   Discharge Prescriptions    AMOXICILLIN-CLAVULANATE (AUGMENTIN) 875-125 MG PER TABLET    Take 1 tablet by mouth every 12 (twelve) hours for 7 days       Start Date: 5/2/2025  End Date: 5/9/2025       Order Dose: 1 tablet       Quantity: 14 tablet    Refills: 0    METHYLPREDNISOLONE 4 MG TABLET THERAPY PACK    Use as directed on package       Start Date: 5/2/2025  End Date: --       Order Dose: --       Quantity: 21 tablet    Refills: 0       None       Portions of the record may have been created with voice recognition software. Occasional wrong word or \"sound a like\" substitutions may have occurred due to the inherent limitations of voice recognition software. Read the chart carefully and recognize, using context, where substitutions have occurred.    Electronically signed by:  Jose F Jacobsen    "

## 2025-05-02 NOTE — ED PROVIDER NOTES
Time reflects when diagnosis was documented in both MDM as applicable and the Disposition within this note       Time User Action Codes Description Comment    5/2/2025  6:22 PM Jame Alejandre Add [J03.90] Tonsillitis           ED Disposition       ED Disposition   Discharge    Condition   Stable    Date/Time   Fri May 2, 2025  7:03 PM    Comment   Mago Gray discharge to home/self care.                   Assessment & Plan       Medical Decision Making  Patient is well-appearing on exam GCS 15, AO x 4.  Patient with normal work of breathing.  No drooling.  No concern for airway involvement.  No facial swelling.  No trismus.  Given her exam and protracted illness will CT to evaluate for abscess.    Given CT read ENT was contacted they recommended outpatient management, no need for inpatient.  Patient tolerated p.o. intake here in the emergency department including applesauce and water.  She remains with normal work of breathing, no drooling.  Given 1 dose of steroids and IV antibiotics here.  Will discharge on Augmentin and Medrol Dosepak.  She was given very strict return precautions and follow-up instructions she is agreeable to this.    Differential diagnosis includes but is not limited to: Tonsillitis, mononucleosis, viral illness    Based on patient's clinical history and physical exam there are no red flag signs or symptoms     Patient denies any additional symptoms on direct questioning except those explicitly noted in the HPI and ROS.     Triage note was reviewed and patient asked directly about concerns mentioned in triage note.     I will order appropriate testing to narrow my differential    Unless otherwise noted:  - There is no language barrier  - Chart was reviewed   - Labs and imaging were reviewed    Amount and/or Complexity of Data Reviewed  Labs: ordered.  Radiology: ordered.    Risk  Prescription drug management.        ED Course as of 05/02/25 1915   Fri May 02, 2025   1830 Secure chat  kelsey Cerna, ENT resident.  Says that no abscess noted on CT as long as patient can eat and drink can be managed outpatient.  Recommended Medrol Dosepak and Augmentin.       Medications   iohexol (OMNIPAQUE) 350 MG/ML injection (MULTI-DOSE) 85 mL (85 mL Intravenous Given 5/2/25 1740)   ampicillin-sulbactam (UNASYN) 3 g in sodium chloride 0.9 % 100 mL IVPB (3 g Intravenous New Bag 5/2/25 1841)   methylPREDNISolone sodium succinate (Solu-MEDROL) injection 60 mg (60 mg Intravenous Given 5/2/25 1835)       ED Risk Strat Scores                    No data recorded        SBIRT 20yo+      Flowsheet Row Most Recent Value   Initial Alcohol Screen: US AUDIT-C     1. How often do you have a drink containing alcohol? 0 Filed at: 05/02/2025 1449   2. How many drinks containing alcohol do you have on a typical day you are drinking?  0 Filed at: 05/02/2025 1449   3b. FEMALE Any Age, or MALE 65+: How often do you have 4 or more drinks on one occassion? 0 Filed at: 05/02/2025 1449   Audit-C Score 0 Filed at: 05/02/2025 1449   [place]: How many times in the past year have you...    Used an illegal drug or used a prescription medication for non-medical reasons? Never Filed at: 05/02/2025 1449                            History of Present Illness       Chief Complaint   Patient presents with    Medical Problem     Pt was seen at [facility] for swollen tonsils. Pt complains of sore throat, pain when swallowing and ear pain. Pt was sent here for re eval due to the severity of swelling she was told. Pt breathing normal during triage.        History reviewed. No pertinent past medical history.   History reviewed. No pertinent surgical history.   History reviewed. No pertinent family history.   Social History     Tobacco Use    Smoking status: Never     Passive exposure: Never    Smokeless tobacco: Never   Substance Use Topics    Alcohol use: Yes     Comment: socially    Drug use: Never      E-Cigarette/Vaping      E-Cigarette/Vaping  Substances      I have reviewed and agree with the history as documented.     20-year-old female with no past medical history presents emergency department with sore throat.  States this started 5 days ago.  Was seen at urgent care and had a strep test done that was negative.  Sent home.  Presented back to urgent care today with still sore throat that is kind of worse.  No fever or chills.  No facial swelling.  No trouble swallowing or breathing.  No sick contacts that she is aware of.  Says she has never been diagnosed with mono.  No abdominal pain vomiting or diarrhea.        Review of Systems   Constitutional:  Negative for chills and fever.   Respiratory:  Negative for cough and shortness of breath.    Cardiovascular:  Negative for chest pain and leg swelling.   Gastrointestinal:  Negative for abdominal pain.   Genitourinary:  Negative for dysuria.   Neurological:  Negative for seizures and syncope.   All other systems reviewed and are negative.          Objective       ED Triage Vitals [05/02/25 1448]   Temperature Pulse Blood Pressure Respirations SpO2 Patient Position - Orthostatic VS   98.2 °F (36.8 °C) 86 146/79 18 99 % Sitting      Temp Source Heart Rate Source BP Location FiO2 (%) Pain Score    Oral Monitor Left arm -- 7      Vitals      Date and Time Temp Pulse SpO2 Resp BP Pain Score FACES Pain Rating User   05/02/25 1449 -- -- 99 % -- -- -- -- AS   05/02/25 1448 98.2 °F (36.8 °C) 86 99 % 18 146/79 7 -- AS            Physical Exam  Vitals and nursing note reviewed.   Constitutional:       General: She is not in acute distress.     Appearance: Normal appearance. She is not ill-appearing, toxic-appearing or diaphoretic.   HENT:      Head: Normocephalic and atraumatic.      Comments: Right tonsil 3+ with exudate.  Left tonsil 2+ with mild exudate.  Uvula midline.  Floor of mouth soft.  No facial swelling or erythema.  No anterior cervical lymphadenopathy.  Lymphadenopathy underneath the right mandible.  No  trismus.     Nose: Nose normal.      Mouth/Throat:      Mouth: Mucous membranes are moist.   Eyes:      General: No scleral icterus.        Right eye: No discharge.         Left eye: No discharge.      Extraocular Movements: Extraocular movements intact.      Conjunctiva/sclera: Conjunctivae normal.      Pupils: Pupils are equal, round, and reactive to light.   Cardiovascular:      Rate and Rhythm: Normal rate and regular rhythm.      Pulses: Normal pulses.      Heart sounds: Normal heart sounds. No murmur heard.     No friction rub. No gallop.   Pulmonary:      Effort: Pulmonary effort is normal. No tachypnea, bradypnea, accessory muscle usage or respiratory distress.      Breath sounds: Normal breath sounds. No stridor. No wheezing, rhonchi or rales.   Chest:      Chest wall: No tenderness.   Abdominal:      General: Abdomen is flat. There is no distension.      Palpations: Abdomen is soft. There is no mass.      Tenderness: There is no abdominal tenderness. There is no right CVA tenderness, left CVA tenderness, guarding or rebound.      Hernia: No hernia is present.   Musculoskeletal:      Cervical back: Normal range of motion and neck supple. No rigidity.      Right lower leg: No edema.      Left lower leg: No edema.   Skin:     General: Skin is warm and dry.      Capillary Refill: Capillary refill takes less than 2 seconds.      Coloration: Skin is not jaundiced.      Findings: No bruising.   Neurological:      Mental Status: She is alert and oriented to person, place, and time.      GCS: GCS eye subscore is 4. GCS verbal subscore is 5. GCS motor subscore is 6.      Cranial Nerves: No dysarthria or facial asymmetry.   Psychiatric:         Mood and Affect: Mood normal.         Behavior: Behavior normal. Behavior is cooperative.         Thought Content: Thought content normal.         Judgment: Judgment normal.         Results Reviewed       Procedure Component Value Units Date/Time    RBC Morphology Reflex Test  [[id number]] Collected: 05/02/25 1609    Lab Status: Final result Specimen: Blood from Arm, Left Updated: 05/02/25 1801    POCT pregnancy, urine [[id number]]  (Normal) Collected: 05/02/25 1727    Lab Status: Final result Updated: 05/02/25 1727     EXT Preg Test, Ur Negative     Control Valid    CBC and differential [[id number]]  (Abnormal) Collected: 05/02/25 1609    Lab Status: Final result Specimen: Blood from Arm, Left Updated: 05/02/25 1708     WBC 11.77 Thousand/uL      RBC 4.55 Million/uL      Hemoglobin 12.8 g/dL      Hematocrit 38.6 %      MCV 85 fL      MCH 28.1 pg      MCHC 33.2 g/dL      RDW 14.0 %      MPV 8.9 fL      Platelets 269 Thousands/uL     Narrative:      This is an appended report.  These results have been appended to a previously verified report.    Manual Differential(PHLEBS Do Not Order) [[id number]]  (Abnormal) Collected: 05/02/25 1609    Lab Status: Final result Specimen: Blood from Arm, Left Updated: 05/02/25 1708     Segmented % 41 %      Bands % 4 %      Lymphocytes % 41 %      Monocytes % 8 %      Eosinophils % 0 %      Basophils % 0 %      Atypical Lymphocytes % 6 %      Absolute Neutrophils 5.30 Thousand/uL      Absolute Lymphocytes 5.53 Thousand/uL      Absolute Monocytes 0.94 Thousand/uL      Absolute Eosinophils 0.00 Thousand/uL      Absolute Basophils 0.00 Thousand/uL      Total Counted --     RBC Morphology Present     Platelet Estimate Adequate     Anisocytosis Present     Polychromasia Present    Basic metabolic panel [[id number]]  (Abnormal) Collected: 05/02/25 1609    Lab Status: Final result Specimen: Blood from Arm, Left Updated: 05/02/25 1636     Sodium 138 mmol/L      Potassium 4.3 mmol/L      Chloride 104 mmol/L      CO2 25 mmol/L      ANION GAP 9 mmol/L      BUN 7 mg/dL      Creatinine 0.46 mg/dL      Glucose 82 mg/dL      Calcium 9.5 mg/dL      eGFR 142 ml/min/1.73sq m     Narrative:      National Kidney Disease Foundation guidelines for Chronic Kidney Disease (CKD):      Stage 1 with normal or high GFR (GFR > 90 mL/min/1.73 square meters)    Stage 2 Mild CKD (GFR = 60-89 mL/min/1.73 square meters)    Stage 3A Moderate CKD (GFR = 45-59 mL/min/1.73 square meters)    Stage 3B Moderate CKD (GFR = 30-44 mL/min/1.73 square meters)    Stage 4 Severe CKD (GFR = 15-29 mL/min/1.73 square meters)    Stage 5 End Stage CKD (GFR <15 mL/min/1.73 square meters)  Note: GFR calculation is accurate only with a steady state creatinine    Mononucleosis screen [[id number]] Collected: 05/02/25 1609    Lab Status: In process Specimen: Blood from Arm, Left Updated: 05/02/25 1612            CT soft tissue neck with contrast   Final Interpretation by Kevon Monteiro MD (05/02 1803)      Bilateral palatine tonsillitis with developing phlegmonous changes/early abscess formation, noting mild-moderate narrowing of the airway at this level. Reactive cervical lymphadenopathy.      The study was marked in EPIC for immediate notification.            Workstation performed: [id number]             Procedures    ED Medication and Procedure Management   None     Patient's Medications   Discharge Prescriptions    AMOXICILLIN-CLAVULANATE (AUGMENTIN) 875-125 MG PER TABLET    Take 1 tablet by mouth every 12 (twelve) hours for 7 days       Start Date: 5/2/2025  End Date: 5/9/2025       Order Dose: 1 tablet       Quantity: 14 tablet    Refills: 0    METHYLPREDNISOLONE 4 MG TABLET THERAPY PACK    Use as directed on package       Start Date: 5/2/2025  End Date: --       Order Dose: --       Quantity: 21 tablet    Refills: 0       ED SEPSIS DOCUMENTATION   Time reflects when diagnosis was documented in both MDM as applicable and the Disposition within this note       Time User Action Codes Description Comment    5/2/2025  6:22 PM Jame Alejandre [J03.90] Tonsillitis                  Jame Alejandre MD  05/02/25 1915       Jame Alejandre MD  05/02/25 1915

## 2025-05-02 NOTE — PROGRESS NOTES
[facility]        NAME: Mago Gray is a 20 y.o. female  : 2005    MRN: [id number]  DATE: May 2, 2025  TIME: 1:55 PM    Assessment and Plan   Acute pharyngitis, unspecified etiology [J02.9]  1. Acute pharyngitis, unspecified etiology  Transfer to other facility        Concern for tonsillar abscess given right tonsil appearance. Sent to ER for further workup.   Go to ER if symptoms get worse.     Patient Instructions       Concern for tonsillar abscess given right tonsil appearance. Sent to ER for further workup.       Chief Complaint     Chief Complaint   Patient presents with    Sore Throat     Was here on Monday. Got tested for strep, both rapid and culture were negative. Now states that tonsils look white and has pain with swallowing. Has been taking tylenol and advil.     Earache     Right ear pain. No known pain in left.          History of Present Illness       Presents with concern for continued sore throat. Was seen 4 days ago with negative strep POC and throat culture. Noted tonsil is white. She is able to swallow but pain is worse. Taking Tylenol and advil.   Right ear pain as well.         Review of Systems   Review of Systems   Constitutional:  Negative for chills, fatigue and fever.   HENT:  Positive for ear pain and sore throat. Negative for congestion.    Respiratory:  Negative for cough and shortness of breath.    Cardiovascular:  Negative for chest pain.   Gastrointestinal:  Negative for abdominal pain.   Musculoskeletal:  Negative for myalgias.   Psychiatric/Behavioral:  Negative for confusion.          Current Medications     No current outpatient medications on file.    Current Allergies     Allergies as of 2025    (No Known Allergies)            The following portions of the patient's history were reviewed and updated as appropriate: allergies, current medications, past family history, past medical history, past social history, past surgical history and problem  "list.     No past medical history on file.    No past surgical history on file.    No family history on file.      Medications have been verified.        Objective   /71 (BP Location: Left arm, Patient Position: Sitting, Cuff Size: Standard)   Pulse (!) 106   Temp 98.4 °F (36.9 °C) (Tympanic)   Resp 20   Ht 5' 6\" (1.676 m)   Wt 85.3 kg (188 lb 1.6 oz)   LMP 03/27/2025 (Approximate)   SpO2 99%   BMI 30.36 kg/m²        Physical Exam     Physical Exam  Vitals reviewed.   Constitutional:       General: She is not in acute distress.     Appearance: Normal appearance.   HENT:      Right Ear: Tympanic membrane, ear canal and external ear normal.      Left Ear: Tympanic membrane, ear canal and external ear normal.      Nose: Nose normal.      Mouth/Throat:      Mouth: Mucous membranes are moist.      Pharynx: Posterior oropharyngeal erythema present.      Tonsils: Tonsillar exudate and tonsillar abscess present. 4+ on the right. 2+ on the left.      Comments: Redness into the right hard palate minimally.   Eyes:      Conjunctiva/sclera: Conjunctivae normal.   Cardiovascular:      Rate and Rhythm: Normal rate and regular rhythm.      Pulses: Normal pulses.      Heart sounds: Normal heart sounds. No murmur heard.  Pulmonary:      Effort: Pulmonary effort is normal. No respiratory distress.      Breath sounds: Normal breath sounds.   Skin:     General: Skin is warm and dry.   Neurological:      General: No focal deficit present.      Mental Status: She is alert and oriented to person, place, and time.   Psychiatric:         Mood and Affect: Mood normal.         Behavior: Behavior normal.                    "

## 2025-05-02 NOTE — DISCHARGE INSTRUCTIONS
Hello you were seen today for sore throat    CT shows you have tonsillitis which is an infection of your tonsils    Please take the antibiotics and steroid as prescribed    Please follow-up with your family doctor    Please return to the emergency department if you develop any trouble breathing, drooling, trouble swallowing, fever, facial swelling, shortness of breath, chest pain, any new symptoms

## 2025-05-02 NOTE — Clinical Note
Mago Gray was seen and treated in our emergency department on 5/2/2025.                Diagnosis:     Mago  may return to school on return date.    She may return on this date: 05/05/2025         If you have any questions or concerns, please don't hesitate to call.      Jame Alejandre MD    ______________________________           _______________          _______________  Hospital Representative                              Date                                Time

## 2025-05-03 ENCOUNTER — RESULTS FOLLOW-UP (OUTPATIENT)
Dept: EMERGENCY DEPT | Facility: HOSPITAL | Age: 20
End: 2025-05-03

## 2025-05-03 LAB — HETEROPH AB SER QL: POSITIVE

## 2025-05-11 ENCOUNTER — OFFICE VISIT (OUTPATIENT)
Dept: URGENT CARE | Age: 20
End: 2025-05-11
Payer: COMMERCIAL

## 2025-05-11 VITALS — HEART RATE: 102 BPM | OXYGEN SATURATION: 98 % | TEMPERATURE: 98.3 F | RESPIRATION RATE: 18 BRPM

## 2025-05-11 DIAGNOSIS — L27.0 DRUG ERUPTION: Primary | ICD-10-CM

## 2025-05-11 PROCEDURE — G0382 LEV 3 HOSP TYPE B ED VISIT: HCPCS

## 2025-05-11 PROCEDURE — S9083 URGENT CARE CENTER GLOBAL: HCPCS

## 2025-05-11 RX ORDER — PREDNISONE 20 MG/1
20 TABLET ORAL DAILY
Qty: 3 TABLET | Refills: 0 | Status: SHIPPED | OUTPATIENT
Start: 2025-05-11 | End: 2025-05-14

## 2025-05-11 NOTE — PROGRESS NOTES
Name: Mago Gray      : 2005      MRN: [id number]  Encounter Provider: WOOD Burris  Encounter Date: 2025   Encounter department: [facility]  :  Assessment & Plan  Drug eruption  Exanthematous drug eruption most likely related to taking Augmentin with mono infection. Patient sitting comfortable on exam table, no acute distress. Recommend stopping Augmentin at this time and starting symptomatic care with OTC antihistamine. Given widespread rash, will treat with short course of steroids. Patient to follow-up if symptoms persist and present to ER with new, worsening or concerning symptoms.     Please take Prednisone daily with food for 3 days.   Recommend Claritin or Zyrtec daily.   Recommend Benadryl as needed for itching.   Orders:    predniSONE 20 mg tablet; Take 1 tablet (20 mg total) by mouth daily for 3 days        History of Present Illness   Urticaria  Pertinent negatives include no diarrhea, fever, shortness of breath or vomiting.     Mago Gray is a 20 y.o. female who presents for evaluation of acute rash.  Patient states that she was recently diagnosed with mononucleosis and treated with steroids and Augmentin.  She states that she took a dose of Augmentin last night and developed a generalized rash.  She describes the rash as pruritic.  She states that the rash is associated with chills and nausea.  She denies any difficulty swallowing or shortness of breath.  She took Xyzal with mild relief of her symptoms.      Review of Systems   Constitutional:  Positive for chills. Negative for fever.   HENT:  Negative for trouble swallowing.    Respiratory:  Negative for shortness of breath.    Cardiovascular:  Negative for chest pain.   Gastrointestinal:  Positive for nausea. Negative for diarrhea and vomiting.   Skin:  Positive for rash.          Objective   Pulse 102   Temp 98.3 °F (36.8 °C)   Resp 18   LMP 2025 (Approximate)   SpO2 98%       Physical Exam  Vitals and nursing note reviewed.   Constitutional:       General: She is not in acute distress.     Appearance: She is well-developed.   HENT:      Head: Normocephalic and atraumatic.      Mouth/Throat:      Mouth: Mucous membranes are moist.      Pharynx: Oropharynx is clear.   Eyes:      Conjunctiva/sclera: Conjunctivae normal.   Cardiovascular:      Rate and Rhythm: Normal rate and regular rhythm.      Heart sounds: No murmur heard.  Pulmonary:      Effort: Pulmonary effort is normal. No respiratory distress.      Breath sounds: Normal breath sounds.   Abdominal:      Palpations: Abdomen is soft.      Tenderness: There is no abdominal tenderness.   Musculoskeletal:         General: No swelling.      Cervical back: Neck supple. No tenderness.   Skin:     General: Skin is warm and dry.      Capillary Refill: Capillary refill takes less than 2 seconds.      Findings: Rash (erythematous macules and papules scattered to face, neck, trunk and extremities) present.   Neurological:      Mental Status: She is alert.   Psychiatric:         Mood and Affect: Mood normal.

## 2025-05-11 NOTE — PATIENT INSTRUCTIONS
Please take Prednisone daily with food for 3 days.   Recommend Claritin or Zyrtec daily.   Recommend Benadryl as needed for itching.

## 2025-06-13 ENCOUNTER — OFFICE VISIT (OUTPATIENT)
Dept: URGENT CARE | Age: 20
End: 2025-06-13
Payer: COMMERCIAL

## 2025-06-13 VITALS
TEMPERATURE: 98.4 F | DIASTOLIC BLOOD PRESSURE: 87 MMHG | BODY MASS INDEX: 30.36 KG/M2 | HEIGHT: 66 IN | SYSTOLIC BLOOD PRESSURE: 129 MMHG | HEART RATE: 110 BPM | RESPIRATION RATE: 18 BRPM | OXYGEN SATURATION: 99 %

## 2025-06-13 DIAGNOSIS — N39.0 URINARY TRACT INFECTION WITH HEMATURIA, SITE UNSPECIFIED: ICD-10-CM

## 2025-06-13 DIAGNOSIS — R31.9 URINARY TRACT INFECTION WITH HEMATURIA, SITE UNSPECIFIED: ICD-10-CM

## 2025-06-13 DIAGNOSIS — R35.0 URINE FREQUENCY: Primary | ICD-10-CM

## 2025-06-13 LAB
SL AMB  POCT GLUCOSE, UA: NEGATIVE
SL AMB LEUKOCYTE ESTERASE,UA: ABNORMAL
SL AMB POCT BILIRUBIN,UA: NEGATIVE
SL AMB POCT BLOOD,UA: ABNORMAL
SL AMB POCT CLARITY,UA: ABNORMAL
SL AMB POCT COLOR,UA: YELLOW
SL AMB POCT KETONES,UA: NEGATIVE
SL AMB POCT NITRITE,UA: POSITIVE
SL AMB POCT PH,UA: 5
SL AMB POCT SPECIFIC GRAVITY,UA: 1.02
SL AMB POCT URINE PROTEIN: 30
SL AMB POCT UROBILINOGEN: 0.2

## 2025-06-13 PROCEDURE — 87077 CULTURE AEROBIC IDENTIFY: CPT | Performed by: NURSE PRACTITIONER

## 2025-06-13 PROCEDURE — 87086 URINE CULTURE/COLONY COUNT: CPT | Performed by: NURSE PRACTITIONER

## 2025-06-13 PROCEDURE — S9083 URGENT CARE CENTER GLOBAL: HCPCS | Performed by: NURSE PRACTITIONER

## 2025-06-13 PROCEDURE — 81002 URINALYSIS NONAUTO W/O SCOPE: CPT | Performed by: NURSE PRACTITIONER

## 2025-06-13 PROCEDURE — 87186 SC STD MICRODIL/AGAR DIL: CPT | Performed by: NURSE PRACTITIONER

## 2025-06-13 PROCEDURE — G0382 LEV 3 HOSP TYPE B ED VISIT: HCPCS | Performed by: NURSE PRACTITIONER

## 2025-06-13 RX ORDER — CEPHALEXIN 500 MG/1
500 CAPSULE ORAL EVERY 12 HOURS SCHEDULED
Qty: 10 CAPSULE | Refills: 0 | Status: SHIPPED | OUTPATIENT
Start: 2025-06-13 | End: 2025-06-18

## 2025-06-13 NOTE — PATIENT INSTRUCTIONS
Discussed urine dip results with the patient.   Recommended treatment with an antibiotic.   We will send the urine for culture and call if any changes need to be made.     Follow up with PCP in 3-5 days.  Proceed to  ER if symptoms worsen.    If tests are performed, our office will contact you with results only if changes need to made to the care plan discussed with you at the visit. You can review your full results on [facility].

## 2025-06-13 NOTE — PROGRESS NOTES
"Name: Mago Gray      : 2005      MRN: [id number]  Encounter Provider: WOOD Hartman  Encounter Date: 2025   Encounter department: [facility]  :  Assessment & Plan  Urine frequency    Orders:    POCT urine dip    Urine culture; Future    Urinary tract infection with hematuria, site unspecified  Discussed urine dip results with the patient.   Recommended treatment with an antibiotic.   We will send the urine for culture and call if any changes need to be made.     Follow up with PCP in 3-5 days.  Proceed to  ER if symptoms worsen.    Orders:    cephalexin (KEFLEX) 500 mg capsule; Take 1 capsule (500 mg total) by mouth every 12 (twelve) hours for 5 days        History of Present Illness   Urinary Tract Infection   Associated symptoms include frequency and urgency. Pertinent negatives include no chills, flank pain, nausea or vomiting.     Mago Gray is a 20 y.o. female who presents for evaluation of frequency, urgency and burning with urination.  Symptoms began 3 days prior and noted that this am, symptoms seem worse. Denies fever, flank or lower abdominal pain.       Review of Systems   Constitutional:  Negative for chills and fever.   Respiratory: Negative.     Cardiovascular: Negative.    Gastrointestinal:  Negative for abdominal pain, diarrhea, nausea and vomiting.   Genitourinary:  Positive for dysuria, frequency and urgency. Negative for flank pain and pelvic pain.   Musculoskeletal:  Negative for myalgias.   Skin:  Negative for rash.   All other systems reviewed and are negative.         Objective   /87   Pulse (!) 110   Temp 98.4 °F (36.9 °C) (Tympanic)   Resp 18   Ht 5' 6\" (1.676 m)   SpO2 99%   BMI 30.36 kg/m²      Physical Exam  Vitals and nursing note reviewed.   Constitutional:       Appearance: Normal appearance.   HENT:      Head: Normocephalic and atraumatic.   Pulmonary:      Effort: Pulmonary effort is normal.   Abdominal:      " Tenderness: There is no right CVA tenderness or left CVA tenderness.     Neurological:      Mental Status: She is alert and oriented to person, place, and time.     Psychiatric:         Mood and Affect: Mood normal.

## 2025-06-15 LAB — BACTERIA UR CULT: ABNORMAL

## 2025-06-18 ENCOUNTER — OFFICE VISIT (OUTPATIENT)
Dept: URGENT CARE | Age: 20
End: 2025-06-18
Payer: COMMERCIAL

## 2025-06-18 VITALS
BODY MASS INDEX: 28.12 KG/M2 | RESPIRATION RATE: 18 BRPM | OXYGEN SATURATION: 98 % | HEIGHT: 66 IN | TEMPERATURE: 98.4 F | SYSTOLIC BLOOD PRESSURE: 148 MMHG | HEART RATE: 122 BPM | WEIGHT: 175 LBS | DIASTOLIC BLOOD PRESSURE: 74 MMHG

## 2025-06-18 DIAGNOSIS — R35.0 URINARY FREQUENCY: Primary | ICD-10-CM

## 2025-06-18 LAB
SL AMB  POCT GLUCOSE, UA: NEGATIVE
SL AMB LEUKOCYTE ESTERASE,UA: NEGATIVE
SL AMB POCT BILIRUBIN,UA: NEGATIVE
SL AMB POCT BLOOD,UA: ABNORMAL
SL AMB POCT CLARITY,UA: CLEAR
SL AMB POCT COLOR,UA: YELLOW
SL AMB POCT KETONES,UA: NEGATIVE
SL AMB POCT NITRITE,UA: NEGATIVE
SL AMB POCT PH,UA: 6
SL AMB POCT SPECIFIC GRAVITY,UA: 1.01
SL AMB POCT URINE HCG: NEGATIVE
SL AMB POCT URINE PROTEIN: NEGATIVE
SL AMB POCT UROBILINOGEN: 0.2

## 2025-06-18 PROCEDURE — G0382 LEV 3 HOSP TYPE B ED VISIT: HCPCS

## 2025-06-18 PROCEDURE — 81002 URINALYSIS NONAUTO W/O SCOPE: CPT

## 2025-06-18 PROCEDURE — 81025 URINE PREGNANCY TEST: CPT

## 2025-06-18 PROCEDURE — S9083 URGENT CARE CENTER GLOBAL: HCPCS

## 2025-06-18 PROCEDURE — 87086 URINE CULTURE/COLONY COUNT: CPT

## 2025-06-18 NOTE — PATIENT INSTRUCTIONS
Urine dip grossly negative in office, urine culture results pending.   Please continue to ensure good hydration, may take OTC Azo for symptom relief.   Follow up with PCP if no improvement within 1-2 weeks.   Go to ED for fever, flank pain, chills, etc.

## 2025-06-18 NOTE — PROGRESS NOTES
[facility]  Name: Mago Gray      : 2005      MRN: [id number]  Encounter Provider: WOOD Griffith  Encounter Date: 2025   Encounter department: [facility] [place]  :  Urine dip grossly negative in office, urine culture results pending.   Please continue to ensure good hydration, may take OTC Azo for symptom relief.   Follow up with PCP if no improvement within 1-2 weeks.   Go to ED for fever, flank pain, chills, etc.   Assessment & Plan  Urinary frequency    Orders:    POCT urine dip    POCT urine HCG    Urine culture; Future        Patient Instructions  Patient Education     Urinary Tract Infection, Adult ED   General Information   You came to the Emergency Department (ED) for a urinary tract infection or UTI. Most UTIs are infections in either your bladder or your kidneys. Bladder infections are more common and may also be called cystitis. Kidney infections are more serious and may also be called pyelonephritis. You need antibiotics to treat a UTI. It is important to take all of your antibiotics even if you start to feel better.  What care is needed at home?   Call your regular doctor to let them know you were in the ED. Make a follow-up appointment if you were told to.  For the first day or so, you may want to take an over-the-counter medicine, like phenazopyridine. This will help to numb your bladder. You will also not have the strong urge to urinate. This medicine causes your urine and tears to look orange. If you have kidney disease, talk to your doctor before taking this medicine.  To lower your chance of getting a UTI in the future, you can:  Drink extra fluids.  If you have sex, urinate right afterwards.  When do I need to get emergency help?   Return to the ED if:   You have very bad pain in your back, shoulder, or belly.  You have a fever of 102.2°F (39°C); shaking chills or sweats even though you are taking antibiotics.  When do I need to call the doctor?    You have a fever up to 100.4°F (38°C).  You notice more blood in your urine.  Your signs get worse or do not improve within 24 hours of starting treatment.  You are not able to urinate for more than 8 hours  Your signs come back after treatment has stopped.  You have new or worsening symptoms.  Last Reviewed Date   2021-03-12  Consumer Information Use and Disclaimer   This generalized information is a limited summary of diagnosis, treatment, and/or medication information. It is not meant to be comprehensive and should be used as a tool to help the user understand and/or assess potential diagnostic and treatment options. It does NOT include all information about conditions, treatments, medications, side effects, or risks that may apply to a specific patient. It is not intended to be medical advice or a substitute for the medical advice, diagnosis, or treatment of a health care provider based on the health care provider's examination and assessment of a patient’s specific and unique circumstances. Patients must speak with a health care provider for complete information about their health, medical questions, and treatment options, including any risks or benefits regarding use of medications. This information does not endorse any treatments or medications as safe, effective, or approved for treating a specific patient. [employer] and its affiliates disclaim any warranty or liability relating to this information or the use thereof. The use of this information is governed by the Terms of Use, available at https://www.e-volo.com/en/know/clinical-effectiveness-terms   Copyright   Follow up with PCP in 3-5 days.  Proceed to  ER if symptoms worsen.    If tests are performed, our office will contact you with results only if changes need to made to the care plan discussed with you at the visit. You can review your full results on [facility].    Chief Complaint:   Chief Complaint   Patient presents with     Possible UTI     Completed course of abx, feels a little better, Still having frequency.      History of Present Illness   Patient is a 20 year old female who presents for reevaluation of UTI symptoms. Per record review, she was seen in this clinic on 06/13/25 for dysuria and urinary frequency. She was prescribed a course of Keflex which she completed yesterday. She does note some improvement in symptoms, although continues to experience some frequency and low back pain. Review of chart reveals that a urine culture performed at last visit did show susceptibility to Cefazolin. She denies flank pain, fever, chills, abdominal pain, vaginal discharge.           Review of Systems   Constitutional:  Negative for fatigue and fever.   HENT:  Negative for congestion, ear discharge, ear pain, postnasal drip, rhinorrhea, sinus pressure, sinus pain, sneezing and sore throat.    Eyes: Negative.  Negative for pain, discharge, redness and itching.   Respiratory: Negative.  Negative for apnea, cough, choking, chest tightness, shortness of breath, wheezing and stridor.    Cardiovascular: Negative.  Negative for chest pain and palpitations.   Gastrointestinal: Negative.  Negative for diarrhea, nausea and vomiting.   Endocrine: Negative.  Negative for polydipsia, polyphagia and polyuria.   Genitourinary:  Positive for frequency. Negative for decreased urine volume, difficulty urinating, dyspareunia, dysuria, enuresis, flank pain, genital sores, hematuria, menstrual problem, pelvic pain, urgency, vaginal bleeding, vaginal discharge and vaginal pain.   Musculoskeletal:  Positive for back pain. Negative for arthralgias, gait problem, joint swelling, myalgias, neck pain and neck stiffness.   Skin: Negative.  Negative for color change and rash.   Allergic/Immunologic: Negative.  Negative for environmental allergies.   Neurological: Negative.  Negative for dizziness, facial asymmetry, light-headedness, numbness and headaches.  "  Hematological: Negative.  Negative for adenopathy.   Psychiatric/Behavioral: Negative.       Past Medical History   Past Medical History[1]  Past Surgical History[2]  Family History[3]  she reports that she has never smoked. She has never been exposed to tobacco smoke. She has never used smokeless tobacco. She reports current alcohol use. She reports that she does not use drugs.  Current Outpatient Medications   Medication Instructions    cephalexin (KEFLEX) 500 mg, Oral, Every 12 hours scheduled    methylPREDNISolone 4 MG tablet therapy pack Use as directed on package   Allergies[4]     Objective   /74   Pulse (!) 122   Temp 98.4 °F (36.9 °C) (Tympanic)   Resp 18   Ht 5' 6\" (1.676 m)   Wt 79.4 kg (175 lb)   LMP 06/10/2025   SpO2 98%   BMI 28.25 kg/m²      Physical Exam  Vitals and nursing note reviewed.   Constitutional:       General: She is not in acute distress.     Appearance: She is well-developed. She is not ill-appearing, toxic-appearing or diaphoretic.      Interventions: She is not intubated.  HENT:      Head: Normocephalic and atraumatic.     Eyes:      Conjunctiva/sclera: Conjunctivae normal.       Cardiovascular:      Rate and Rhythm: Normal rate and regular rhythm.      Heart sounds: Normal heart sounds, S1 normal and S2 normal. Heart sounds not distant. No murmur heard.  Pulmonary:      Effort: Pulmonary effort is normal. No tachypnea, bradypnea, accessory muscle usage, prolonged expiration, respiratory distress or retractions. She is not intubated.      Breath sounds: Normal breath sounds. No stridor, decreased air movement or transmitted upper airway sounds. No decreased breath sounds, wheezing, rhonchi or rales.   Abdominal:      General: Abdomen is flat.      Palpations: Abdomen is soft.      Tenderness: There is no abdominal tenderness. There is no right CVA tenderness or left CVA tenderness.     Musculoskeletal:         General: No swelling.      Cervical back: Neck supple. " "    Skin:     General: Skin is warm and dry.      Capillary Refill: Capillary refill takes less than 2 seconds.     Neurological:      Mental Status: She is alert.     Psychiatric:         Mood and Affect: Mood normal.         Portions of the record may have been created with voice recognition software.  Occasional wrong word or \"sound a like\" substitutions may have occurred due to the inherent limitations of voice recognition software.  Read the chart carefully and recognize, using context, where substitutions have occurred.       [1] No past medical history on file.  [2] No past surgical history on file.  [3] No family history on file.  [4] No Known Allergies    "

## 2025-06-19 LAB — BACTERIA UR CULT: NORMAL
